# Patient Record
Sex: MALE | Race: AMERICAN INDIAN OR ALASKA NATIVE
[De-identification: names, ages, dates, MRNs, and addresses within clinical notes are randomized per-mention and may not be internally consistent; named-entity substitution may affect disease eponyms.]

---

## 2018-05-08 ENCOUNTER — HOSPITAL ENCOUNTER (EMERGENCY)
Dept: HOSPITAL 42 - ED | Age: 48
Discharge: HOME | End: 2018-05-08
Payer: COMMERCIAL

## 2018-05-08 VITALS — RESPIRATION RATE: 16 BRPM | DIASTOLIC BLOOD PRESSURE: 76 MMHG | SYSTOLIC BLOOD PRESSURE: 127 MMHG

## 2018-05-08 VITALS — OXYGEN SATURATION: 98 % | TEMPERATURE: 98.3 F

## 2018-05-08 VITALS — HEART RATE: 70 BPM

## 2018-05-08 DIAGNOSIS — R07.9: Primary | ICD-10-CM

## 2018-05-08 DIAGNOSIS — J45.909: ICD-10-CM

## 2018-05-08 DIAGNOSIS — F17.210: ICD-10-CM

## 2018-05-08 DIAGNOSIS — F12.10: ICD-10-CM

## 2018-05-08 LAB
ALBUMIN SERPL-MCNC: 3.7 G/DL (ref 3–4.8)
ALBUMIN/GLOB SERPL: 1.3 {RATIO} (ref 1.1–1.8)
ALT SERPL-CCNC: 27 U/L (ref 7–56)
AST SERPL-CCNC: 21 U/L (ref 17–59)
BASOPHILS # BLD AUTO: 0.02 K/MM3 (ref 0–2)
BASOPHILS NFR BLD: 0.4 % (ref 0–3)
BUN SERPL-MCNC: 15 MG/DL (ref 7–21)
CALCIUM SERPL-MCNC: 8.7 MG/DL (ref 8.4–10.5)
EOSINOPHIL # BLD: 0.2 10*3/UL (ref 0–0.7)
EOSINOPHIL NFR BLD: 2.9 % (ref 1.5–5)
ERYTHROCYTE [DISTWIDTH] IN BLOOD BY AUTOMATED COUNT: 13.8 % (ref 11.5–14.5)
GFR NON-AFRICAN AMERICAN: > 60
GRANULOCYTES # BLD: 2.97 10*3/UL (ref 1.4–6.5)
GRANULOCYTES NFR BLD: 57.8 % (ref 50–68)
HGB BLD-MCNC: 16.2 G/DL (ref 14–18)
LYMPHOCYTES # BLD: 1.7 10*3/UL (ref 1.2–3.4)
LYMPHOCYTES NFR BLD AUTO: 32.9 % (ref 22–35)
MCH RBC QN AUTO: 30.6 PG (ref 25–35)
MCHC RBC AUTO-ENTMCNC: 35 G/DL (ref 31–37)
MCV RBC AUTO: 87.5 FL (ref 80–105)
MONOCYTES # BLD AUTO: 0.3 10*3/UL (ref 0.1–0.6)
MONOCYTES NFR BLD: 6 % (ref 1–6)
PLATELET # BLD: 220 10^3/UL (ref 120–450)
PMV BLD AUTO: 9.8 FL (ref 7–11)
RBC # BLD AUTO: 5.29 10^6/UL (ref 3.5–6.1)
TROPONIN I SERPL-MCNC: < 0.01 NG/ML
WBC # BLD AUTO: 5.1 10^3/UL (ref 4.5–11)

## 2018-05-08 PROCEDURE — 80053 COMPREHEN METABOLIC PANEL: CPT

## 2018-05-08 PROCEDURE — 84484 ASSAY OF TROPONIN QUANT: CPT

## 2018-05-08 PROCEDURE — 83615 LACTATE (LD) (LDH) ENZYME: CPT

## 2018-05-08 PROCEDURE — 71045 X-RAY EXAM CHEST 1 VIEW: CPT

## 2018-05-08 PROCEDURE — 93005 ELECTROCARDIOGRAM TRACING: CPT

## 2018-05-08 PROCEDURE — 82550 ASSAY OF CK (CPK): CPT

## 2018-05-08 PROCEDURE — 96374 THER/PROPH/DIAG INJ IV PUSH: CPT

## 2018-05-08 PROCEDURE — 85025 COMPLETE CBC W/AUTO DIFF WBC: CPT

## 2018-05-08 PROCEDURE — 99284 EMERGENCY DEPT VISIT MOD MDM: CPT

## 2018-05-08 NOTE — RAD
HISTORY:

chest pain on monitor  



COMPARISON:

No prior. 



FINDINGS:



LUNGS:

No active pulmonary disease.



PLEURA:

No significant pleural effusion identified, no pneumothorax apparent.



CARDIOVASCULAR:

Normal.



OSSEOUS STRUCTURES:

No significant abnormalities.



VISUALIZED UPPER ABDOMEN:

Normal.



OTHER FINDINGS:

None.



IMPRESSION:

No active disease.

## 2018-05-08 NOTE — ED PDOC
Arrival/HPI





- General


Chief Complaint: Chest Pain


Time Seen by Provider: 05/08/18 13:56





Past Medical History





- Travel History


Have you recently traveled outside [place] w/in the past 3 mons?: No





- Past History


Past History: Non-Contributing (Asthma)





- Infectious Disease


Hx of Infectious Diseases: None





- Pulmonary


Hx Asthma: Yes





- Renal


Hx Renal Disorder: No





- Musculoskeletal/Rheumatological


Hx Musculoskeletal Disorders: No





- Genitourinary/Gynecological


Hx Genitourinary Disorders: No





- Psychiatric


Hx Psychophysiologic Disorder: No


Hx Substance Use: Yes





- Anesthesia


Hx Anesthesia: No





Family/Social History


Family/Social History: Diabetes


Smoking Status: Heavy Smoker > 10 Cigarettes Daily


Hx Alcohol Use: No


Hx Substance Use: Yes


Substance used: Marijuana





Allergies/Home Meds


Allergies/Adverse Reactions: 


Allergies





No Known Allergies Allergy (Verified 05/08/18 14:03)


 








Home Medications: 


 Home Meds











 Medication  Instructions  Recorded  Confirmed


 


No Known Home Med  05/08/18 05/08/18














Physical Exam


Vital Signs











  Temp Pulse Resp BP Pulse Ox


 


 05/08/18 15:48   70  18  127/86  98


 


 05/08/18 14:00  98.3 F  77  16  150/85  98


 


 05/08/18 13:54  99.1 F  77  18  150/85  99














Medical Decision Making





- Lab Interpretations


Lab Results: 








 05/08/18 14:40 





 05/08/18 14:40 





 Lab Results





05/08/18 14:40: Sodium 144, Potassium 3.6, Chloride 108 H, Carbon Dioxide 26, 

Anion Gap 13, BUN 15, Creatinine 1.1, Est GFR (African Amer) > 60, Est GFR (Non-

Af Amer) > 60, Random Glucose 113 H, Calcium 8.7, Total Bilirubin 0.3, AST 21, 

ALT 27, Alkaline Phosphatase 55, Lactate Dehydrogenase 337, Total Creatine 

Kinase 168, Troponin I < 0.01, Total Protein 6.6, Albumin 3.7, Globulin 2.9, 

Albumin/Globulin Ratio 1.3


05/08/18 14:40: WBC 5.1, RBC 5.29, Hgb 16.2, Hct 46.3, MCV 87.5, MCH 30.6, MCHC 

35.0, RDW 13.8, Plt Count 220, MPV 9.8, Gran % 57.8, Lymph % (Auto) 32.9, Mono 

% (Auto) 6.0, Eos % (Auto) 2.9, Baso % (Auto) 0.4, Gran # 2.97, Lymph # (Auto) 

1.7, Mono # (Auto) 0.3, Eos # (Auto) 0.2, Baso # (Auto) 0.02











- RAD Interpretation


Radiology Orders: 








05/08/18 14:23


CXR [CHEST PORTABLE] [RAD] Stat 














- Medication Orders


Current Medication Orders: 











Discontinued Medications





Aspirin (Aspirin)  325 mg PO STAT STA


   Stop: 05/08/18 14:19


   Last Admin: 05/08/18 14:35  Dose: 325 mg





Ketorolac Tromethamine (Toradol)  30 mg IVP STAT STA


   Stop: 05/08/18 15:34


   Last Admin: 05/08/18 15:54  Dose: 30 mg





MAR Pain Assessment


 Document     05/08/18 15:54  SRE  (Rec: 05/08/18 15:55  SRE  [id number])


     Pain Reassessment


      Is this a pain reassessment?               Yes


     Sleep


      Is patient sleeping during reassessment?   No


     Presence of Pain


      Presence of Pain                           Yes


     Pain Scale Used


      Pain Scale Used                            Numeric


     Location


      Pain Location Body Site                    Chest


     Description


      Description                                Intermittent


IVP Administration


 Document     05/08/18 15:54  SRE  (Rec: 05/08/18 15:55  SRE  [id number])


     Charges for Administration


      # of IVP Administrations                   1














Disposition/Present on Arrival





- Present on Arrival


Any Indicators Present on Arrival: No


History of DVT/PE: No


History of Uncontrolled Diabetes: No


Urinary Catheter: No


History of Decub. Ulcer: No


History Surgical Site Infection Following: None





- Disposition


Have Diagnosis and Disposition been Completed?: Yes


Diagnosis: 


 Chest pain in adult





Disposition: HOME/ ROUTINE


Disposition Time: 16:34


Patient Problems: 


 Current Active Problems











Problem Status Onset


 


Chest pain in adult Acute  











Condition: IMPROVED


Discharge Instructions (ExitCare):  Chest Pain (ED)


Additional Instructions: 





Isis Mejias, thank you for letting us take care of you today. Your provider was 

[Paul Sorensen]. You were treated for [Chest pain]. The emergency medical care you 

received today was directed at your acute symptoms. If you were prescribed any 

medication, please fill it and take as directed. It may take several days for 

your symptoms to resolve. Return to the Emergency Department if your symptoms 

worsen, do not improve, or if you have any other problems.





Please contact your doctor or call one of the physicians/clinics you have been 

referred to that are listed on the Patient Visit Information form that is 

included in your discharge packet. Bring any paperwork you were given at 

discharge with you along with any medications you are taking to your follow up 

visit. Our treatment cannot replace ongoing medical care by a primary care 

provider (PCP) outside of the emergency department.





Thank you for allowing the [email or web address] team to be part of your care today.








If you had an X-Ray or CT scan: A Radiologist will review the ED reading if any 

change in treatment is needed we will contact you.***





If you had a blood, urine, or wound culture: It will take several days for the 

results, if any change in treatment is needed we will contact you.***





If you had an STI test: It will take 48 hours for the results. Please call 

after 1 week if you have not heard back.***


Referrals: 


[facility], [Primary Care Provider] - Follow up with primary


Forms:  [email or web address] (English)

## 2018-05-08 NOTE — ED PDOC
Arrival/HPI





- General


Chief Complaint: Chest Pain


Time Seen by Provider: 05/08/18 13:56


Historian: Patient





- Critical Care


Narrative Critical Care (Text): 





05/08/18 14:09


48 year old male wth history of asthma, tobacco and marijuana smoker presented 

with chest pain that started last night at 11 pm. Pain is localized to left 

chest and non radiating. Pt describes as  soreness, sharp. Denies any shortness 

of breah, cough or fever. He woke up with similar pain this morning. 





Past Medical History





- Provider Review


Nursing Documentation Reviewed: Yes





- Travel History


Have you recently traveled outside [place] w/in the past 3 mons?: No





- Past History


Past History: Non-Contributing (Asthma)





- Infectious Disease


Hx of Infectious Diseases: None





- Pulmonary


Hx Asthma: Yes





- Renal


Hx Renal Disorder: No





- Musculoskeletal/Rheumatological


Hx Musculoskeletal Disorders: No





- Genitourinary/Gynecological


Hx Genitourinary Disorders: No





- Psychiatric


Hx Psychophysiologic Disorder: No


Hx Substance Use: Yes





- Anesthesia


Hx Anesthesia: No





Family/Social History


Family/Social History: Diabetes


Smoking Status: Heavy Smoker > 10 Cigarettes Daily


Hx Alcohol Use: No


Hx Substance Use: Yes


Substance used: Marijuana





Allergies/Home Meds


Allergies/Adverse Reactions: 


Allergies





No Known Allergies Allergy (Verified 05/08/18 14:03)


 








Home Medications: 


 Home Meds











 Medication  Instructions  Recorded  Confirmed


 


No Known Home Med  05/08/18 05/08/18














Review of Systems





- Physician Review


All systems were reviewed & negative as marked: Yes





- Review of Systems


Constitutional: Normal


Eyes: Normal


ENT: Normal


Respiratory: Normal


Cardiovascular: Chest Pain


Gastrointestinal: Normal


Genitourinary Male: Normal


Musculoskeletal: Normal


Skin: Normal


Neurological: Normal


Endocrine: Normal


Hemo/Lymphatic: Normal


Psychiatric: Normal





Physical Exam


Vital Signs











  Temp Pulse Resp BP Pulse Ox


 


 05/08/18 15:48   70  18  127/86  98


 


 05/08/18 14:00  98.3 F  77  16  150/85  98


 


 05/08/18 13:54  99.1 F  77  18  150/85  99











Temperature: Afebrile


Blood Pressure: Normal


Pulse: Regular


Respiratory Rate: Normal


Appearance: Positive for: Well-Appearing, Non-Toxic, Comfortable


Pain Distress: None


Mental Status: Positive for: Alert and Oriented X 3





- Systems Exam


Head: Present: Atraumatic, Normocephalic


Pupils: Present: PERRL


Extroacular Muscles: Present: EOMI


Conjunctiva: Present: Normal


Mouth: Present: Moist Mucous Membranes


Neck: Present: Normal Range of Motion


Respiratory/Chest: Present: Clear to Auscultation, Good Air Exchange.  No: 

Respiratory Distress, Accessory Muscle Use


Cardiovascular: Present: Regular Rate and Rhythm, Normal S1, S2.  No: Murmurs


Abdomen: No: Tenderness, Distention, Peritoneal Signs


Back: Present: Normal Inspection


Upper Extremity: Present: Normal Inspection.  No: Cyanosis, Edema


Lower Extremity: Present: Normal Inspection.  No: Edema


Neurological: Present: GCS=15, CN II-XII Intact, Speech Normal


Skin: Present: Warm, Dry, Normal Color.  No: Rashes


Psychiatric: Present: Alert, Oriented x 3, Normal Insight, Normal Concentration





Medical Decision Making


ED Course and Treatment: 





05/08/18 14:16


48 year old male with chest pain r/o MI 


Plan: check labs, ekg, pain control ,cxr and reassess


05/08/18 14:27


 EKG:


NSR


77 bpm


left axis





05/08/18 16:25


Addendum: pt is feeling better and is pain free. 


Pt can be discharged home.





- Lab Interpretations


Lab Results: 








 05/08/18 14:40 





 05/08/18 14:40 





 Lab Results





05/08/18 14:40: Sodium 144, Potassium 3.6, Chloride 108 H, Carbon Dioxide 26, 

Anion Gap 13, BUN 15, Creatinine 1.1, Est GFR (African Amer) > 60, Est GFR (Non-

Af Amer) > 60, Random Glucose 113 H, Calcium 8.7, Total Bilirubin 0.3, AST 21, 

ALT 27, Alkaline Phosphatase 55, Lactate Dehydrogenase 337, Total Creatine 

Kinase 168, Troponin I < 0.01, Total Protein 6.6, Albumin 3.7, Globulin 2.9, 

Albumin/Globulin Ratio 1.3


05/08/18 14:40: WBC 5.1, RBC 5.29, Hgb 16.2, Hct 46.3, MCV 87.5, MCH 30.6, MCHC 

35.0, RDW 13.8, Plt Count 220, MPV 9.8, Gran % 57.8, Lymph % (Auto) 32.9, Mono 

% (Auto) 6.0, Eos % (Auto) 2.9, Baso % (Auto) 0.4, Gran # 2.97, Lymph # (Auto) 

1.7, Mono # (Auto) 0.3, Eos # (Auto) 0.2, Baso # (Auto) 0.02











- RAD Interpretation


Radiology Orders: 








05/08/18 14:23


CXR [CHEST PORTABLE] [RAD] Stat 














- Medication Orders


Current Medication Orders: 











Discontinued Medications





Aspirin (Aspirin)  325 mg PO STAT STA


   Stop: 05/08/18 14:19


   Last Admin: 05/08/18 14:35  Dose: 325 mg





Ketorolac Tromethamine (Toradol)  30 mg IVP STAT STA


   Stop: 05/08/18 15:34


   Last Admin: 05/08/18 15:54  Dose: 30 mg





MAR Pain Assessment


 Document     05/08/18 15:54  SRE  (Rec: 05/08/18 15:55  SRE  [id number])


     Pain Reassessment


      Is this a pain reassessment?               Yes


     Sleep


      Is patient sleeping during reassessment?   No


     Presence of Pain


      Presence of Pain                           Yes


     Pain Scale Used


      Pain Scale Used                            Numeric


     Location


      Pain Location Body Site                    Chest


     Description


      Description                                Intermittent


IVP Administration


 Document     05/08/18 15:54  SRE  (Rec: 05/08/18 15:55  SRE  [id number])


     Charges for Administration


      # of IVP Administrations                   1














Disposition/Present on Arrival





- Present on Arrival


Any Indicators Present on Arrival: No


History of DVT/PE: No


History of Uncontrolled Diabetes: No


Urinary Catheter: No


History of Decub. Ulcer: No


History Surgical Site Infection Following: None





- Disposition


Have Diagnosis and Disposition been Completed?: Yes


Diagnosis: 


 Chest pain in adult





Disposition: HOME/ ROUTINE


Disposition Time: 16:28


Patient Plan: Discharge


Patient Problems: 


 Current Active Problems











Problem Status Onset


 


Chest pain in adult Acute  











Condition: IMPROVED


Discharge Instructions (ExitCare):  Chest Pain (ED)


Additional Instructions: 





Isis Mejias, thank you for letting us take care of you today. Your provider was 

[Paul Sorensen]. You were treated for [Chest pain]. The emergency medical care you 

received today was directed at your acute symptoms. If you were prescribed any 

medication, please fill it and take as directed. It may take several days for 

your symptoms to resolve. Return to the Emergency Department if your symptoms 

worsen, do not improve, or if you have any other problems.





Please contact your doctor or call one of the physicians/clinics you have been 

referred to that are listed on the Patient Visit Information form that is 

included in your discharge packet. Bring any paperwork you were given at 

discharge with you along with any medications you are taking to your follow up 

visit. Our treatment cannot replace ongoing medical care by a primary care 

provider (PCP) outside of the emergency department.





Thank you for allowing the [facility] team to be part of your care today.








If you had an X-Ray or CT scan: A Radiologist will review the ED reading if any 

change in treatment is needed we will contact you.***





If you had a blood, urine, or wound culture: It will take several days for the 

results, if any change in treatment is needed we will contact you.***





If you had an STI test: It will take 48 hours for the results. Please call 

after 1 week if you have not heard back.***


Referrals: 


[facility], [Primary Care Provider] - Follow up with primary


Forms:  [email or web address] (English)

## 2018-05-09 NOTE — CARD
--------------- APPROVED REPORT --------------





EKG Measurement

Heart [id number]

OH [id number]

[id number]

[id number]

[id number]



<Conclusion>

Normal sinus rhythm

Nonspecific ST abnormality